# Patient Record
(demographics unavailable — no encounter records)

---

## 2018-07-22 NOTE — EDM.PDOC
ED HPI GENERAL MEDICAL PROBLEM





- General


Chief Complaint: Gastrointestinal Problem


Stated Complaint: VOMITING AND NAUSEA


Time Seen by Provider: 07/22/18 16:49


Source of Information: Reports: Patient


History Limitations: Reports: No Limitations





- History of Present Illness


INITIAL COMMENTS - FREE TEXT/NARRATIVE: 


HISTORY AND PHYSICAL:





"[employer] services was use with this patient ([place] speaking)





History of present illness:


Patient is a 20-year-old female who presents to the emergency room today with a 

weeklong history of nausea, vomiting, fatigue and generally feeling unwell. She 

states that anytime she tries to eat she loses her appetite and has nausea. 

Denies any fever, chills, chest pain, shortness of breath or cough. She denies 

any abdominal pain, pelvic pain, constipation, diarrhea or dysuria.


Last menstrual period was June 16, 2018.





Review of systems: 


As per history of present illness and below otherwise all systems reviewed and 

negative.





Past medical history: 


As per history of present illness and as reviewed below otherwise 

noncontributory.





Surgical history: 


As per history of present illness and as reviewed below otherwise 

noncontributory.





Social history: 


No reported history of drug or alcohol abuse.





Family history: 


As per history of present illness and as reviewed below otherwise 

noncontributory.





Physical exam:


General: Well-developed and well-nourished 20-year-old female. Alert and 

oriented. Nontoxic appearing and in no acute distress.


HEENT: Atraumatic, normocephalic, pupils equal and reactive bilaterally, 

negative for conjunctival pallor or scleral icterus, mucous membranes moist, 

throat clear, neck supple, nontender, trachea midline. No drooling or trismus 

noted. No meningeal signs


Lungs: Clear to auscultation, breath sounds equal bilaterally, chest nontender.


Heart: S1S2, regular rate and rhythm without overt murmur


Abdomen: Soft, nondistended, nontender. Negative for masses or 

hepatosplenomegaly. Negative for costovertebral tenderness.


Pelvis: Stable nontender.


Genitourinary: Deferred.


Rectal: Deferred.


Skin: Intact, warm, dry. No lesions or rashes noted.


Extremities: Atraumatic, negative for cords or calf pain. Neurovascular 

unremarkable.


Neuro: Awake, alert, oriented. Cranial nerves II through XII unremarkable. 

Cerebellum unremarkable. Motor and sensory unremarkable throughout. Exam 

nonfocal.





Notes:


Lab work is unremarkable with the exception of her urine. She does have the 

urinary tract infection and is positive for pregnancy. Will treat her with 

Macrobid twice a day 5 days. Prescription for Zofran urine. We discussed 

establishing care with an OB/GYN for prenatal care and further evaluation and 

management. She voices understanding and is agreeable. She denies any further 

questions or concerns at this time.





Diagnostics:


CBC, CMP, UA, urine pregnancy





Therapeutics:


IV fluid, Zofran, macrobid





Impression: 


First trimester pregnancy


Nausea and vomiting


UTI





Plan:


1. Please start a prenatal vitamin once daily


2. Increase your oral fluids to small frequent sips throughout the day. You may 

want to consider small frequent meals her at the day.


3. Take your antibiotic as directed (treat the UTI).


4. Please establish care with a local OB/GYN for prenatal care. Return to the 

ED as needed and as discussed.





Definitive disposition and diagnosis as appropriate pending reevaluation and 

review of above.





Duration: Week(s):


Location: Reports: Generalized





- Related Data


 Allergies











Allergy/AdvReac Type Severity Reaction Status Date / Time


 


No Known Allergies Allergy   Verified 07/22/18 17:08











Home Meds: 


 Home Meds





. [No Known Home Meds]  07/22/18 [History]











Past Medical History





- Past Health History


Medical/Surgical History: Denies Medical/Surgical History





Social & Family History





- Family History


Family Medical History: Noncontributory





- Tobacco Use


Smoking Status *Q: Never Smoker





- Caffeine Use


Caffeine Use: Reports: None





- Recreational Drug Use


Recreational Drug Use: No





ED ROS GENERAL





- Review of Systems


Review Of Systems: ROS reveals no pertinent complaints other than HPI.





ED EXAM PREGNANCY





- Physical Exam


Exam: See Below (See dictation)





Course





- Vital Signs


Last Recorded V/S: 


 Last Vital Signs











Temp  97.7 F   07/22/18 16:56


 


Pulse  80   07/22/18 16:56


 


Resp  18   07/22/18 16:56


 


BP  123/45 L  07/22/18 16:56


 


Pulse Ox  98   07/22/18 16:56














- Orders/Labs/Meds


Orders: 


 Active Orders 24 hr











 Category Date Time Status


 


 Orthostatic Vital Signs [RC] ASDIRECTED Care  07/22/18 17:06 Active


 


 HCG QUALITATIVE,URINE [URCHEM] Stat Lab  07/22/18 17:15 Ordered


 


 UA W/MICROSCOPIC [URIN] Stat Lab  07/22/18 17:15 Ordered











Labs: 


 Laboratory Tests











  07/22/18 07/22/18 07/22/18 Range/Units





  17:15 17:15 17:25 


 


WBC    7.15  (4.0-11.0)  K/uL


 


RBC    5.37  (4.30-5.90)  M/uL


 


Hgb    13.3  (12.0-16.0)  g/dL


 


Hct    38.5  (36.0-46.0)  %


 


MCV    71.7 L  (80.0-98.0)  fL


 


MCH    24.8 L  (27.0-32.0)  pg


 


MCHC    34.5  (31.0-37.0)  g/dL


 


RDW Std Deviation    42.6  (28.0-62.0)  fl


 


RDW Coeff of Bere    16 H  (11.0-15.0)  %


 


Plt Count    373  (150-400)  K/uL


 


MPV    10.10  (7.40-12.00)  fL


 


Neut % (Auto)    65.9  (48.0-80.0)  %


 


Lymph % (Auto)    23.1  (16.0-40.0)  %


 


Mono % (Auto)    9.5  (0.0-15.0)  %


 


Eos % (Auto)    1.1  (0.0-7.0)  %


 


Baso % (Auto)    0.4  (0.0-1.5)  %


 


Neut # (Auto)    4.7  (1.4-5.7)  K/uL


 


Lymph # (Auto)    1.7  (0.6-2.4)  K/uL


 


Mono # (Auto)    0.7  (0.0-0.8)  K/uL


 


Eos # (Auto)    0.1  (0.0-0.7)  K/uL


 


Baso # (Auto)    0.0  (0.0-0.1)  K/uL


 


Nucleated RBC %    0.0  /100WBC


 


Nucleated RBCs #    0  K/uL


 


Sodium     (136-145)  mmol/L


 


Potassium     (3.5-5.1)  mmol/L


 


Chloride     ()  mmol/L


 


Carbon Dioxide     (21.0-32.0)  mmol/L


 


BUN     (7.0-18.0)  mg/dL


 


Creatinine     (0.6-1.0)  mg/dL


 


Est Cr Clr Drug Dosing     mL/min


 


Estimated GFR (MDRD)     ml/min


 


Glucose     ()  mg/dL


 


Calcium     (8.5-10.1)  mg/dL


 


Total Bilirubin     (0.2-1.0)  mg/dL


 


AST     (15-37)  IU/L


 


ALT     (14-63)  IU/L


 


Alkaline Phosphatase     ()  U/L


 


Total Protein     (6.4-8.2)  g/dL


 


Albumin     (3.4-5.0)  g/dL


 


Globulin     (2.0-3.5)  g/dL


 


Albumin/Globulin Ratio     (1.3-2.8)  


 


Urine Color  DARK YELLOW    


 


Urine Appearance  SLT CLOUDY    


 


Urine pH  6.0    (5.0-8.0)  


 


Ur Specific Gravity  1.020    (1.001-1.035)  


 


Urine Protein  30    (NEGATIVE)  mg/dL


 


Urine Glucose (UA)  NEGATIVE    (NEGATIVE)  mg/dL


 


Urine Ketones  TRACE H    (NEGATIVE)  mg/dL


 


Urine Occult Blood  NEGATIVE    (NEGATIVE)  


 


Urine Nitrite  NEGATIVE    (NEGATIVE)  


 


Urine Bilirubin  SMALL H    (NEGATIVE)  


 


Urine Ictotest  NEGATIVE    


 


Urine Urobilinogen  2.0 H    (<2.0)  EU/dL


 


Ur Leukocyte Esterase  NEGATIVE    (NEGATIVE)  


 


Urine RBC  1-4    (0-2/HPF)  


 


Urine WBC  5-10    (0-5/HPF)  


 


Ur Epithelial Cells  MANY    (NONE-FEW)  


 


Amorphous Sediment  LIGHT    (NEGATIVE)  


 


Urine Bacteria  1+ H    (NEGATIVE)  


 


Urine Mucus  LIGHT    (NONE-MOD)  


 


Urine HCG, Qual   POSITIVE   (NEGATIVE)  














  07/22/18 Range/Units





  17:25 


 


WBC   (4.0-11.0)  K/uL


 


RBC   (4.30-5.90)  M/uL


 


Hgb   (12.0-16.0)  g/dL


 


Hct   (36.0-46.0)  %


 


MCV   (80.0-98.0)  fL


 


MCH   (27.0-32.0)  pg


 


MCHC   (31.0-37.0)  g/dL


 


RDW Std Deviation   (28.0-62.0)  fl


 


RDW Coeff of Bere   (11.0-15.0)  %


 


Plt Count   (150-400)  K/uL


 


MPV   (7.40-12.00)  fL


 


Neut % (Auto)   (48.0-80.0)  %


 


Lymph % (Auto)   (16.0-40.0)  %


 


Mono % (Auto)   (0.0-15.0)  %


 


Eos % (Auto)   (0.0-7.0)  %


 


Baso % (Auto)   (0.0-1.5)  %


 


Neut # (Auto)   (1.4-5.7)  K/uL


 


Lymph # (Auto)   (0.6-2.4)  K/uL


 


Mono # (Auto)   (0.0-0.8)  K/uL


 


Eos # (Auto)   (0.0-0.7)  K/uL


 


Baso # (Auto)   (0.0-0.1)  K/uL


 


Nucleated RBC %   /100WBC


 


Nucleated RBCs #   K/uL


 


Sodium  135 L  (136-145)  mmol/L


 


Potassium  3.4 L  (3.5-5.1)  mmol/L


 


Chloride  102  ()  mmol/L


 


Carbon Dioxide  24.3  (21.0-32.0)  mmol/L


 


BUN  9  (7.0-18.0)  mg/dL


 


Creatinine  0.6  (0.6-1.0)  mg/dL


 


Est Cr Clr Drug Dosing  131.75  mL/min


 


Estimated GFR (MDRD)  > 60.0  ml/min


 


Glucose  76  ()  mg/dL


 


Calcium  9.2  (8.5-10.1)  mg/dL


 


Total Bilirubin  0.6  (0.2-1.0)  mg/dL


 


AST  52 H  (15-37)  IU/L


 


ALT  91 H  (14-63)  IU/L


 


Alkaline Phosphatase  39 L  ()  U/L


 


Total Protein  8.2  (6.4-8.2)  g/dL


 


Albumin  4.2  (3.4-5.0)  g/dL


 


Globulin  4.0 H  (2.0-3.5)  g/dL


 


Albumin/Globulin Ratio  1.1 L  (1.3-2.8)  


 


Urine Color   


 


Urine Appearance   


 


Urine pH   (5.0-8.0)  


 


Ur Specific Gravity   (1.001-1.035)  


 


Urine Protein   (NEGATIVE)  mg/dL


 


Urine Glucose (UA)   (NEGATIVE)  mg/dL


 


Urine Ketones   (NEGATIVE)  mg/dL


 


Urine Occult Blood   (NEGATIVE)  


 


Urine Nitrite   (NEGATIVE)  


 


Urine Bilirubin   (NEGATIVE)  


 


Urine Ictotest   


 


Urine Urobilinogen   (<2.0)  EU/dL


 


Ur Leukocyte Esterase   (NEGATIVE)  


 


Urine RBC   (0-2/HPF)  


 


Urine WBC   (0-5/HPF)  


 


Ur Epithelial Cells   (NONE-FEW)  


 


Amorphous Sediment   (NEGATIVE)  


 


Urine Bacteria   (NEGATIVE)  


 


Urine Mucus   (NONE-MOD)  


 


Urine HCG, Qual   (NEGATIVE)  











Meds: 


Medications














Discontinued Medications














Generic Name Dose Route Start Last Admin





  Trade Name Richard  PRN Reason Stop Dose Admin


 


Sodium Chloride  1,000 mls @ 999 mls/hr  07/22/18 17:06  07/22/18 17:28





  Normal Saline  IV  07/22/18 18:06  999 mls/hr





  STAT ONE   Administration





     





     





     





     


 


Nitrofurantoin Macrocrystals  100 mg  07/22/18 18:25  





  Macrobid  PO  07/22/18 18:26  





  ONETIME ONE   





     





     





     





     


 


Ondansetron HCl  4 mg  07/22/18 17:06  07/22/18 17:28





  Zofran  IVPUSH  07/22/18 17:07  4 mg





  ONETIME ONE   Administration





     





     





     





     














Departure





- Departure


Time of Disposition: 18:23


Disposition: Home, Self-Care 01


Clinical Impression: 


 Nausea and vomiting during pregnancy, First trimester pregnancy, UTI, Urinary 

tract infectious disease








- Discharge Information


Referrals: 


PCP,None [Primary Care Provider] - 


Forms:  ED Department Discharge


Additional Instructions: 


The following information is given to patients seen in the emergency department 

who are being discharged to home. This information is to outline your options 

for follow-up care. We provide all patients seen in our emergency department 

with a follow-up referral.





The need for follow-up, as well as the timing and circumstances, are variable 

depending upon the specifics of your emergency department visit.





If you don't have a primary care physician on staff, we will provide you with a 

referral. We always advise you to contact your personal physician following an 

emergency department visit to inform them of the circumstance of the visit and 

for follow-up with them and/or the need for any referrals to a consulting 

specialist.





The emergency department will also refer you to a specialist when appropriate. 

This referral assures that you have the opportunity for follow-up care with a 

specialist. All of these measure are taken in an effort to provide you with 

optimal care, which includes your follow-up.





Under all circumstances we always encourage you to contact your private 

physician who remains a resource for coordinating your care. When calling for 

follow-up care, please make the office aware that this follow-up is from your 

recent emergency room visit. If for any reason you are refused follow-up, 

please contact the [facility] Emergency 

Department at [phone] and asked to speak to the emergency department 

charge nurse.





[facility]


Primary Care - Women's Health


[address] [address]


Phone: [phone]


Fax: [phone]





[facility]


[address] [place] [address]


Phone: [phone]


Fax: [phone]





1. Please start a prenatal vitamin once daily


2. Increase your oral fluids to small frequent sips throughout the day. You may 

want to consider small frequent meals her at the day.


3. Take your antibiotic as directed (treat the UTI).


4. Please establish care with a local OB/GYN for prenatal care. Return to the 

ED as needed and as discussed.





- My Orders


Last 24 Hours: 


My Active Orders





07/22/18 17:06


Orthostatic Vital Signs [RC] ASDIRECTED 





07/22/18 17:15


HCG QUALITATIVE,URINE [URCHEM] Stat 


UA W/MICROSCOPIC [URIN] Stat 














- Assessment/Plan


Last 24 Hours: 


My Active Orders





07/22/18 17:06


Orthostatic Vital Signs [RC] ASDIRECTED 





07/22/18 17:15


HCG QUALITATIVE,URINE [URCHEM] Stat 


UA W/MICROSCOPIC [URIN] Stat